# Patient Record
Sex: MALE | Race: WHITE | HISPANIC OR LATINO
[De-identification: names, ages, dates, MRNs, and addresses within clinical notes are randomized per-mention and may not be internally consistent; named-entity substitution may affect disease eponyms.]

---

## 2021-08-26 PROBLEM — Z00.00 ENCOUNTER FOR PREVENTIVE HEALTH EXAMINATION: Status: ACTIVE | Noted: 2021-08-26

## 2021-09-09 ENCOUNTER — APPOINTMENT (OUTPATIENT)
Dept: PEDIATRIC ORTHOPEDIC SURGERY | Facility: CLINIC | Age: 66
End: 2021-09-09
Payer: OTHER MISCELLANEOUS

## 2021-09-09 VITALS — BODY MASS INDEX: 33.32 KG/M2 | WEIGHT: 200 LBS | HEIGHT: 65 IN

## 2021-09-09 PROCEDURE — 99072 ADDL SUPL MATRL&STAF TM PHE: CPT

## 2021-09-09 PROCEDURE — 99213 OFFICE O/P EST LOW 20 MIN: CPT | Mod: 25

## 2021-09-09 PROCEDURE — 20552 NJX 1/MLT TRIGGER POINT 1/2: CPT

## 2021-09-09 RX ADMIN — Medication 0 %: at 00:00

## 2021-09-09 RX ADMIN — Medication 0 MG/ML: at 00:00

## 2021-09-09 NOTE — PROCEDURE
[de-identified] : The right lumbosacral spine was injected with 2 mL of 1% plain lidocaine and 1 mL of 40 mg of Depo-Medrol

## 2021-09-09 NOTE — HISTORY OF PRESENT ILLNESS
[de-identified] : This 66-year-old male returns for reevaluation of chronic lumbar radiculitis.  This patient is many years status post laminectomy and fusion for a work-related injury.  He has recently had marked increase in his back and right leg pain.

## 2021-09-09 NOTE — ASSESSMENT
[FreeTextEntry1] : Right lumbar radiculitis\par \par This patient will continue symptomatic treatment.  He has been given 15 oxycodone tablets.  He will return on a as needed basis.\par \par Encounter time: 20 minutes

## 2021-09-09 NOTE — PHYSICAL EXAM
[de-identified] : On physical examination the patient has significant decrease in range of motion in all planes with regards to the lumbar spine motion.  He has positive straight leg raising on the right at 50 degrees.  Motor or sensory and deep tendon reflex examination of the both lower extremities is within normal limits.  There is marked right-sided lumbar muscle spasm.

## 2021-09-10 ENCOUNTER — MED ADMIN CHARGE (OUTPATIENT)
Age: 66
End: 2021-09-10

## 2021-09-10 RX ORDER — METHYLPRED ACET/NACL,ISO-OS/PF 40 MG/ML
40 VIAL (ML) INJECTION
Qty: 1 | Refills: 0 | Status: COMPLETED | OUTPATIENT
Start: 2021-09-09

## 2021-09-10 RX ORDER — LIDOCAINE HYDROCHLORIDE 10 MG/ML
1 INJECTION, SOLUTION INFILTRATION; PERINEURAL
Qty: 0 | Refills: 0 | Status: COMPLETED | OUTPATIENT
Start: 2021-09-09

## 2022-02-23 ENCOUNTER — APPOINTMENT (OUTPATIENT)
Dept: PEDIATRIC ORTHOPEDIC SURGERY | Facility: CLINIC | Age: 67
End: 2022-02-23
Payer: OTHER MISCELLANEOUS

## 2022-02-23 VITALS — HEIGHT: 65 IN | WEIGHT: 200 LBS | BODY MASS INDEX: 33.32 KG/M2

## 2022-02-23 PROCEDURE — 20552 NJX 1/MLT TRIGGER POINT 1/2: CPT

## 2022-02-23 PROCEDURE — 99212 OFFICE O/P EST SF 10 MIN: CPT | Mod: 25

## 2022-02-23 PROCEDURE — 99072 ADDL SUPL MATRL&STAF TM PHE: CPT

## 2022-02-23 NOTE — PROCEDURE
[de-identified] : Trigger point injection into the right lumbar musculature was accomplished with 2 mils of 1% plain lidocaine and 1 mL of 40 mg of Kenalog.

## 2022-02-23 NOTE — ASSESSMENT
[FreeTextEntry1] : Right lumbar radiculitis with myalgia\par \par Patient has been given a prescription for oxycodone and Celebrex.\par \par Encounter time 16 minutes

## 2022-02-23 NOTE — HISTORY OF PRESENT ILLNESS
[de-identified] : This 66-year-old male returns for reevaluation of right lumbar radiculitis which has increased significantly over the past several weeks.  This is making activities of daily living quite difficult for this patient.  He does not complain of paresthesias in his right leg.  He does have pain in the right leg.

## 2022-02-23 NOTE — PHYSICAL EXAM
[de-identified] : On physical examination there is right lumbar muscle spasm with pain on right lateral bending and rotation.  Straight leg raising test is positive on the right at 50 degrees.  Motor or sensory and deep tendon reflex examination of both lower extremities is within normal limits.

## 2022-05-05 ENCOUNTER — APPOINTMENT (OUTPATIENT)
Dept: PEDIATRIC ORTHOPEDIC SURGERY | Facility: CLINIC | Age: 67
End: 2022-05-05
Payer: OTHER MISCELLANEOUS

## 2022-05-05 VITALS — HEIGHT: 65 IN | WEIGHT: 200 LBS | BODY MASS INDEX: 33.32 KG/M2

## 2022-05-05 PROCEDURE — 99212 OFFICE O/P EST SF 10 MIN: CPT

## 2022-05-05 PROCEDURE — 99072 ADDL SUPL MATRL&STAF TM PHE: CPT

## 2022-05-05 NOTE — PHYSICAL EXAM
[de-identified] : On physical examination the patient has marked tenderness in the right and left lumbar musculature worse on the right than the left.  There is tenderness in these areas as well.  Straight leg raising is positive on the right at 40 degrees.  Motor sensory and deep tendon reflex examination of both lower extremities is within normal limits.

## 2022-05-05 NOTE — ASSESSMENT
[FreeTextEntry1] : Chronic lumbar radiculitis\par \par Patient has been given a prescription for oxycodone to control the pain.  He will return in 1 month at which time he will probably have a cortisone injection.\par \par Encounter time: 18 minutes

## 2022-05-05 NOTE — HISTORY OF PRESENT ILLNESS
[de-identified] : This 66-year-old male returns with marked increase in right-sided low back pain without history of new injury.  Pain radiates into his right leg.

## 2022-06-06 ENCOUNTER — APPOINTMENT (OUTPATIENT)
Dept: PEDIATRIC ORTHOPEDIC SURGERY | Facility: CLINIC | Age: 67
End: 2022-06-06

## 2022-06-14 ENCOUNTER — APPOINTMENT (OUTPATIENT)
Dept: PEDIATRIC ORTHOPEDIC SURGERY | Facility: CLINIC | Age: 67
End: 2022-06-14

## 2022-09-08 ENCOUNTER — APPOINTMENT (OUTPATIENT)
Dept: PEDIATRIC ORTHOPEDIC SURGERY | Facility: CLINIC | Age: 67
End: 2022-09-08

## 2022-09-08 VITALS — WEIGHT: 200 LBS | HEIGHT: 65 IN | BODY MASS INDEX: 33.32 KG/M2

## 2022-09-08 PROCEDURE — 20552 NJX 1/MLT TRIGGER POINT 1/2: CPT

## 2022-09-08 PROCEDURE — 99072 ADDL SUPL MATRL&STAF TM PHE: CPT

## 2022-09-08 PROCEDURE — 99213 OFFICE O/P EST LOW 20 MIN: CPT | Mod: 25

## 2022-09-08 NOTE — PROCEDURE
[de-identified] : 2 mils of 1% plain lidocaine and 1 mL of 40 mg of Depo-Medrol have been injected into the right lumbar musculature.  (Trigger point injection)

## 2022-09-08 NOTE — ASSESSMENT
[FreeTextEntry1] : Chronic lumbar radiculopathy status post work-related injury\par \par Patient has also been given a prescription for Celebrex.

## 2022-09-08 NOTE — HISTORY OF PRESENT ILLNESS
[de-identified] : This 67-year-old male returns for reevaluation of chronic lumbar radiculopathy with degenerative arthritis of the lumbar spine who is status post spinal fusion and development of degenerative arthritis of the Lumbar spine.

## 2022-09-08 NOTE — PHYSICAL EXAM
[de-identified] : On physical examination the patient has significant decrease in range of motion of the lumbar spine in all planes.  Straight leg raising is positive on the right at 40 degrees.  Motor sensory and deep tendon reflex examination of both lower extremities is within normal limits.  Patient has marked right-sided lumbar muscle spasm and tenderness.

## 2023-03-14 ENCOUNTER — APPOINTMENT (OUTPATIENT)
Dept: PEDIATRIC ORTHOPEDIC SURGERY | Facility: CLINIC | Age: 68
End: 2023-03-14

## 2023-05-08 ENCOUNTER — APPOINTMENT (OUTPATIENT)
Dept: PEDIATRIC ORTHOPEDIC SURGERY | Facility: CLINIC | Age: 68
End: 2023-05-08
Payer: OTHER MISCELLANEOUS

## 2023-05-08 VITALS
BODY MASS INDEX: 33.32 KG/M2 | HEIGHT: 65 IN | SYSTOLIC BLOOD PRESSURE: 186 MMHG | WEIGHT: 200 LBS | TEMPERATURE: 97.6 F | DIASTOLIC BLOOD PRESSURE: 101 MMHG

## 2023-05-08 PROCEDURE — 99212 OFFICE O/P EST SF 10 MIN: CPT

## 2023-05-08 RX ORDER — DICLOFENAC SODIUM 50 MG/1
50 TABLET, DELAYED RELEASE ORAL TWICE DAILY
Qty: 30 | Refills: 2 | Status: ACTIVE | COMMUNITY
Start: 2023-05-08 | End: 1900-01-01

## 2023-05-08 NOTE — ASSESSMENT
[FreeTextEntry1] : Chronic lumbar radiculitis\par \par This patient will continue symptomatic treatment.  He will return for reevaluation as necessary.

## 2023-05-08 NOTE — HISTORY OF PRESENT ILLNESS
[de-identified] : This 67-year-old male returns for reevaluation of chronic lumbar radiculitis with significant increase in back pain recently.  This is making activities of daily living quite difficult for this patient.

## 2023-05-08 NOTE — PHYSICAL EXAM
[de-identified] : On physical examination patient has bilateral lumbar muscle spasm worse on the right than the left.  Right lateral bending and rotation increases his pain.  Patient has positive straight leg raising at 50 degrees on the right and 60 degrees on the left.  Motor or sensory and deep tendon reflex examination of both lower extremities is within normal limits.

## 2023-06-06 ENCOUNTER — APPOINTMENT (OUTPATIENT)
Dept: PEDIATRIC ORTHOPEDIC SURGERY | Facility: CLINIC | Age: 68
End: 2023-06-06

## 2023-12-26 ENCOUNTER — APPOINTMENT (OUTPATIENT)
Dept: PEDIATRIC ORTHOPEDIC SURGERY | Facility: CLINIC | Age: 68
End: 2023-12-26
Payer: OTHER MISCELLANEOUS

## 2023-12-26 VITALS
WEIGHT: 205 LBS | DIASTOLIC BLOOD PRESSURE: 92 MMHG | BODY MASS INDEX: 34.16 KG/M2 | HEIGHT: 65 IN | SYSTOLIC BLOOD PRESSURE: 175 MMHG | TEMPERATURE: 96.6 F

## 2023-12-26 DIAGNOSIS — M79.18 MYALGIA, OTHER SITE: ICD-10-CM

## 2023-12-26 DIAGNOSIS — M54.16 RADICULOPATHY, LUMBAR REGION: ICD-10-CM

## 2023-12-26 PROCEDURE — 20552 NJX 1/MLT TRIGGER POINT 1/2: CPT

## 2023-12-26 PROCEDURE — 99213 OFFICE O/P EST LOW 20 MIN: CPT | Mod: 25

## 2023-12-26 RX ORDER — CELECOXIB 200 MG/1
200 CAPSULE ORAL
Qty: 30 | Refills: 2 | Status: COMPLETED | COMMUNITY
Start: 2022-02-23 | End: 2023-12-26

## 2023-12-26 RX ORDER — CELECOXIB 200 MG/1
200 CAPSULE ORAL
Qty: 60 | Refills: 2 | Status: COMPLETED | COMMUNITY
Start: 2022-09-08 | End: 2023-12-26

## 2023-12-26 RX ORDER — DICLOFENAC SODIUM 1% 10 MG/G
1 GEL TOPICAL DAILY
Qty: 1 | Refills: 0 | Status: ACTIVE | COMMUNITY
Start: 2023-12-26 | End: 1900-01-01

## 2023-12-26 RX ORDER — OXYCODONE AND ACETAMINOPHEN 5; 325 MG/1; MG/1
5-325 TABLET ORAL
Qty: 20 | Refills: 0 | Status: COMPLETED | COMMUNITY
Start: 2023-05-08 | End: 2023-12-26

## 2023-12-26 RX ORDER — OXYCODONE AND ACETAMINOPHEN 5; 325 MG/1; MG/1
5-325 TABLET ORAL
Qty: 20 | Refills: 0 | Status: COMPLETED | COMMUNITY
Start: 2022-02-23 | End: 2023-12-26

## 2023-12-26 RX ORDER — MELOXICAM 15 MG/1
15 TABLET ORAL DAILY
Qty: 30 | Refills: 2 | Status: COMPLETED | COMMUNITY
Start: 2021-08-26 | End: 2023-12-26

## 2023-12-26 RX ORDER — OXYCODONE AND ACETAMINOPHEN 5; 325 MG/1; MG/1
5-325 TABLET ORAL
Qty: 15 | Refills: 0 | Status: COMPLETED | COMMUNITY
Start: 2021-09-09 | End: 2023-12-26

## 2023-12-26 RX ORDER — OXYCODONE AND ACETAMINOPHEN 5; 325 MG/1; MG/1
5-325 TABLET ORAL
Qty: 20 | Refills: 0 | Status: COMPLETED | COMMUNITY
Start: 2022-05-05 | End: 2023-12-26

## 2023-12-26 RX ORDER — IBUPROFEN 800 MG/1
800 TABLET, FILM COATED ORAL 3 TIMES DAILY
Qty: 30 | Refills: 1 | Status: ACTIVE | COMMUNITY
Start: 2023-12-26 | End: 1900-01-01

## 2023-12-26 RX ORDER — CELECOXIB 200 MG/1
200 CAPSULE ORAL
Qty: 60 | Refills: 2 | Status: COMPLETED | COMMUNITY
Start: 2023-05-08 | End: 2023-12-26

## 2023-12-26 NOTE — ASSESSMENT
[FreeTextEntry1] : Impression: Myofascial pain/lumbar radiculitis.  I have injected the trigger point in the lumbar spine musculature.  He has been placed on ibuprofen 800 mg with GI precautions along with diclofenac gel.  Will continue conservatively return as necessary there is no change in his compensation status

## 2023-12-26 NOTE — HISTORY OF PRESENT ILLNESS
[de-identified] : This patient returns with a flare of severe back pain radiating into the left buttock.  No injury this is secondary to his work-related injury dating back to February 8, 1988..  He has not had any obvious new attic or precipitating event.  No motor weakness bladder or bowel dysfunction. [FreeTextEntry1] : This patient's recent complaints are reflective of his original injury of February 8, 1988.  There has been no change in his functional status he is completely disabled and continues as such.

## 2023-12-26 NOTE — PHYSICAL EXAM
[de-identified] : Exam today reveals painful gait he has painful restricted motion to the lumbar spine in all planes paravertebral muscle spasm and tenderness is noted more so on the left side of the midline with a trigger point in the musculature.  No tension signs present straight leg raising is uncomfortable though negative he neurologically is stable